# Patient Record
Sex: MALE | Race: WHITE | NOT HISPANIC OR LATINO | Employment: PART TIME | URBAN - METROPOLITAN AREA
[De-identification: names, ages, dates, MRNs, and addresses within clinical notes are randomized per-mention and may not be internally consistent; named-entity substitution may affect disease eponyms.]

---

## 2018-12-24 ENCOUNTER — OFFICE VISIT (OUTPATIENT)
Dept: URGENT CARE | Age: 33
End: 2018-12-24
Payer: COMMERCIAL

## 2018-12-24 ENCOUNTER — APPOINTMENT (OUTPATIENT)
Dept: RADIOLOGY | Age: 33
End: 2018-12-24
Attending: PHYSICIAN ASSISTANT
Payer: COMMERCIAL

## 2018-12-24 VITALS
BODY MASS INDEX: 26.68 KG/M2 | WEIGHT: 170 LBS | RESPIRATION RATE: 16 BRPM | HEIGHT: 67 IN | SYSTOLIC BLOOD PRESSURE: 184 MMHG | HEART RATE: 111 BPM | TEMPERATURE: 98.4 F | DIASTOLIC BLOOD PRESSURE: 112 MMHG | OXYGEN SATURATION: 100 %

## 2018-12-24 DIAGNOSIS — S63.617A SPRAIN OF LEFT LITTLE FINGER, INITIAL ENCOUNTER: ICD-10-CM

## 2018-12-24 DIAGNOSIS — S60.222A CONTUSION OF LEFT HAND, INITIAL ENCOUNTER: Primary | ICD-10-CM

## 2018-12-24 DIAGNOSIS — S69.92XA HAND INJURY, LEFT, INITIAL ENCOUNTER: ICD-10-CM

## 2018-12-24 PROCEDURE — 99213 OFFICE O/P EST LOW 20 MIN: CPT | Performed by: FAMILY MEDICINE

## 2018-12-24 PROCEDURE — 73130 X-RAY EXAM OF HAND: CPT

## 2018-12-24 RX ORDER — IBUPROFEN 200 MG
TABLET ORAL EVERY 6 HOURS PRN
COMMUNITY

## 2018-12-24 NOTE — PROGRESS NOTES
[facility]        NAME: Roro Scruggs is a 28 y o  male  : 1985    MRN: [id number]  DATE: 2018  TIME: 11:14 AM    Assessment and Plan   Contusion of left hand, initial encounter [S60 222A]  1  Contusion of left hand, initial encounter  XR hand 3+ vw left         Patient Instructions       Follow up with PCP in 3-5 days  Proceed to  ER if symptoms worsen  Chief Complaint     Chief Complaint   Patient presents with    Hand Injury     left hand         History of Present Illness       Patient for evaluation of injury to his left hand and small finger the after slipping while coming down the steps and landing on his upper back and arms     Patient also sustained some injuries to his upper arms  Patient states the may reason is here because he is having continued pain and swelling in his left little finger and hand  Review of Systems   Review of Systems      Current Medications       Current Outpatient Prescriptions:     ibuprofen (MOTRIN) 200 mg tablet, Take by mouth every 6 (six) hours as needed for mild pain, Disp: , Rfl:     loratadine (CLARITIN) 5 MG chewable tablet, Chew 5 mg daily, Disp: , Rfl:     Current Allergies     Allergies as of 2018 - never reviewed   Allergen Reaction Noted    Sulfa antibiotics  2018            The following portions of the patient's history were reviewed and updated as appropriate: allergies, current medications, past family history, past medical history, past social history, past surgical history and problem list      No past medical history on file  No past surgical history on file  No family history on file  Medications have been verified  Objective   BP (!) 184/112   Pulse (!) 111   Temp 98 4 °F (36 9 °C)   Resp 16   Ht 5' 7" (1 702 m)   Wt 77 1 kg (170 lb)   SpO2 100%   BMI 26 63 kg/m²        Physical Exam     Physical Exam   Constitutional: He is oriented to person, place, and time   He appears well-developed and well-nourished  No distress  Musculoskeletal:   Range of motion of the left hand and small finger intact but limited range of motion of the small finger  No laxity at the PIP, DIP, MCP    FDS and FDP tendons intact  Focal soft tissue swelling of the small finger extending back into the hand  No visible ecchymosis present  Patient has some ecchymosis of the right posterior shoulder and left elbow with no point tenderness  Patient has full range of motion of both shoulders and elbows and wrists  No open wounds  Neurological: He is alert and oriented to person, place, and time  Skin: Skin is warm and dry  Psychiatric: He has a normal mood and affect  His behavior is normal    Nursing note and vitals reviewed

## 2018-12-24 NOTE — PATIENT INSTRUCTIONS
Ice or heat as needed    Advance activities as tolerated    Follow-up Orthopedics if symptoms are persisting      Take ibuprofen as needed